# Patient Record
Sex: MALE | Race: OTHER | HISPANIC OR LATINO | URBAN - METROPOLITAN AREA
[De-identification: names, ages, dates, MRNs, and addresses within clinical notes are randomized per-mention and may not be internally consistent; named-entity substitution may affect disease eponyms.]

---

## 2023-06-26 ENCOUNTER — EMERGENCY (EMERGENCY)
Facility: HOSPITAL | Age: 48
LOS: 1 days | Discharge: HOME | End: 2023-06-26
Attending: EMERGENCY MEDICINE
Payer: MEDICAID

## 2023-06-26 VITALS
TEMPERATURE: 99 F | RESPIRATION RATE: 20 BRPM | WEIGHT: 130.07 LBS | DIASTOLIC BLOOD PRESSURE: 91 MMHG | SYSTOLIC BLOOD PRESSURE: 156 MMHG | OXYGEN SATURATION: 95 % | HEART RATE: 81 BPM

## 2023-06-26 VITALS — RESPIRATION RATE: 18 BRPM | OXYGEN SATURATION: 95 %

## 2023-06-26 PROCEDURE — 93005 ELECTROCARDIOGRAM TRACING: CPT

## 2023-06-26 PROCEDURE — 99284 EMERGENCY DEPT VISIT MOD MDM: CPT

## 2023-06-26 PROCEDURE — 93010 ELECTROCARDIOGRAM REPORT: CPT

## 2023-06-26 PROCEDURE — T1013: CPT

## 2023-06-26 PROCEDURE — 99285 EMERGENCY DEPT VISIT HI MDM: CPT | Mod: 25

## 2023-06-26 RX ADMIN — Medication 50 MILLIGRAM(S): at 16:21

## 2023-06-26 RX ADMIN — Medication 50 MILLIGRAM(S): at 12:14

## 2023-06-26 NOTE — ED ADULT NURSE NOTE - NSFALLHARMRISKINTERV_ED_ALL_ED

## 2023-06-26 NOTE — ED PROVIDER NOTE - PHYSICAL EXAMINATION
Head: atraumatic, normacephalic  Face: atraumatic, no crepitus no orbiral/maxillary/mandibular ttp  throat: uvula midline no exudates  eyes: perrla eomi  heart: rrr s1s2  lungs: ctab  abd: soft, nt nd +bs no rebound/guarding no cva ttp  skin: warm  LE: no swelling, no calf ttp  back: no midline cervical/thoracic/lumbar ttp  neuro: + tremors aao x 3 cn iii-xii intact strength 5/5 bl no tongue fasciculations

## 2023-06-26 NOTE — ED PROVIDER NOTE - CLINICAL SUMMARY MEDICAL DECISION MAKING FREE TEXT BOX
47yo M hx of alcohol abuse, htn pw alcohol withdrawal. states that feels shaky currently in police custody. drinks daily 1 pint of vodka. last drink yesterday. Denies f/c/n/v/cp/sob/palpitations/ cough/rash/headache/dizziness/abd.pain/d/c/dysuria/hematuria. notes hx of alcohol withdrawal seizures in the past.    alcohol withdrawal ciwa 8 will give librium reassess pt under arrest will need meds to go back  as only has court tomorrow as missed it today

## 2023-06-26 NOTE — ED PROVIDER NOTE - PROGRESS NOTE DETAILS
Resident Julia Oakes: symptoms improved with medication. CIWA 2. Okay for discharge. Resident Julia Oakes: discharge delayed due to patient volume and acuity, patient tremu

## 2023-06-26 NOTE — ED ADULT NURSE NOTE - CHPI ED NUR SYMPTOMS NEG
no abdominal distension/no abdominal pain/no confusion/no disorientation/no fever/no pain/no vomiting

## 2023-06-26 NOTE — ED ADULT NURSE NOTE - OBJECTIVE STATEMENT
a&ox4 BIB [facility] ( under arrest) c/o "withdrawing from alcohol" pt reports daily etoh abuse , "1 bottle tequila  and vodka " for past 20+ years.  states last drink was yesterday. + tremors + anxiety  pt states he has had a seizure from "withdrawals" in the past but not on any home meds.  denies further symptoms/complaints.

## 2023-06-26 NOTE — ED PROVIDER NOTE - NSFOLLOWUPINSTRUCTIONS_ED_ALL_ED_FT
- Follow up with your doctor within 2-3 days.   - Please do not drink alcohol in excess.  Please seek help for your alcohol use problem and never drive after consuming any amount of alcohol.  Please also do not take any tylenol with alcohol as it increases your risk of liver damage.   - Return to the ED for any new or worsening symptoms.     Alcohol Abuse    Alcohol intoxication occurs when the amount of alcohol that a person has consumed impairs his or her ability to mentally and physically function. Chronic alcohol consumption can also lead to a variety of health issues including neurological disease, stomach disease, heart disease, liver disease, etc. Do not drive after drinking alcohol. Drinking enough alcohol to end up in an Emergency Room suggests you may have an alcohol abuse problem. Seek help at a drug addiction center.    SEEK IMMEDIATE MEDICAL CARE IF YOU HAVE ANY OF THE FOLLOWING SYMPTOMS: seizures, vomiting blood, blood in your stool, lightheadedness/dizziness, or becoming shaky to tremulous when you stop drinking.

## 2023-06-26 NOTE — ED PROVIDER NOTE - PATIENT PORTAL LINK FT
You can access the FollowMyHealth Patient Portal offered by [facility] by registering at the following website: [email or web address]. By joining [employer]’s FollowMyHealth portal, you will also be able to view your health information using other applications (apps) compatible with our system. You can access the FollowMyHealth Patient Portal offered by [facility] by registering at the following website: [email or web address]. By joining [employer]’s FollowMyHealth portal, you will also be able to view your health information using other applications (apps) compatible with our system.

## 2023-06-26 NOTE — SBIRT NOTE ADULT - NSSBIRTALCPASSREFTXDET_GEN_A_CORE
Passive Referral: Referral for complete assessment and level of care determination at a certified treatment facility was   completed by giving the patient information for treatment facilities that met their needs and encouraging them to call   for an appointment.   [

## 2023-06-26 NOTE — ED PROVIDER NOTE - OBJECTIVE STATEMENT
49yo M hx of alcohol abuse, htn pw alcohol withdrawal. states that feels shaky currently in police custody. drinks daily 1 pint of vodka. last drink yesterday. Denies f/c/n/v/cp/sob/palpitations/ cough/rash/headache/dizziness/abd.pain/d/c/dysuria/hematuria. notes hx of alcohol withdrawal seizures in the past.